# Patient Record
Sex: MALE | Race: ASIAN | NOT HISPANIC OR LATINO | Employment: FULL TIME | URBAN - METROPOLITAN AREA
[De-identification: names, ages, dates, MRNs, and addresses within clinical notes are randomized per-mention and may not be internally consistent; named-entity substitution may affect disease eponyms.]

---

## 2017-01-10 ENCOUNTER — HOSPITAL ENCOUNTER (EMERGENCY)
Facility: HOSPITAL | Age: 42
Discharge: HOME | End: 2017-01-11
Attending: EMERGENCY MEDICINE
Payer: MEDICAID

## 2017-01-10 DIAGNOSIS — N21.1 URETHRAL STONE: Primary | ICD-10-CM

## 2017-01-10 DIAGNOSIS — N36.8 URETHRAL MEATUS PAIN: ICD-10-CM

## 2017-01-10 DIAGNOSIS — N20.0 KIDNEY STONE: ICD-10-CM

## 2017-01-10 DIAGNOSIS — N48.89 PENIS PAIN: ICD-10-CM

## 2017-01-10 PROCEDURE — 51702 INSERT TEMP BLADDER CATH: CPT

## 2017-01-10 PROCEDURE — 25000003 PHARM REV CODE 250: Performed by: PHYSICIAN ASSISTANT

## 2017-01-10 PROCEDURE — 99284 EMERGENCY DEPT VISIT MOD MDM: CPT | Mod: 25

## 2017-01-10 PROCEDURE — 96365 THER/PROPH/DIAG IV INF INIT: CPT

## 2017-01-10 PROCEDURE — 96372 THER/PROPH/DIAG INJ SC/IM: CPT

## 2017-01-10 PROCEDURE — 96375 TX/PRO/DX INJ NEW DRUG ADDON: CPT

## 2017-01-10 PROCEDURE — 63600175 PHARM REV CODE 636 W HCPCS: Performed by: PHYSICIAN ASSISTANT

## 2017-01-10 RX ORDER — LIDOCAINE HYDROCHLORIDE 20 MG/ML
JELLY TOPICAL
Status: COMPLETED | OUTPATIENT
Start: 2017-01-10 | End: 2017-01-10

## 2017-01-10 RX ORDER — KETOROLAC TROMETHAMINE 30 MG/ML
30 INJECTION, SOLUTION INTRAMUSCULAR; INTRAVENOUS
Status: COMPLETED | OUTPATIENT
Start: 2017-01-10 | End: 2017-01-10

## 2017-01-10 RX ORDER — MORPHINE SULFATE 10 MG/ML
4 INJECTION INTRAMUSCULAR; INTRAVENOUS; SUBCUTANEOUS
Status: COMPLETED | OUTPATIENT
Start: 2017-01-10 | End: 2017-01-10

## 2017-01-10 RX ADMIN — MORPHINE SULFATE 4 MG: 10 INJECTION INTRAVENOUS at 10:01

## 2017-01-10 RX ADMIN — CEFTRIAXONE 1 G: 1 INJECTION, SOLUTION INTRAVENOUS at 11:01

## 2017-01-10 RX ADMIN — LIDOCAINE HYDROCHLORIDE 10 ML: 20 JELLY TOPICAL at 09:01

## 2017-01-10 RX ADMIN — KETOROLAC TROMETHAMINE 30 MG: 30 INJECTION, SOLUTION INTRAMUSCULAR at 10:01

## 2017-01-10 RX ADMIN — LIDOCAINE HYDROCHLORIDE: 20 JELLY TOPICAL at 11:01

## 2017-01-10 NOTE — ED AVS SNAPSHOT
[facility]  [place] [address]               Trace Vo   1/10/2017  9:26 PM   ED    Description:  Male : 1975   Department:  [facility]           Your Care was Coordinated By:     Provider Role From To    Celina Bojorquez MD Attending Provider 01/10/17 2135 --    Charlie Muro PA-C Physician Assistant 01/10/17 2135 --      Reason for Visit     Penis Pain           Diagnoses this Visit        Comments    Urethral stone    -  Primary     Kidney stone         Penis pain         Urethral meatus pain           ED Disposition     ED Disposition Condition Comment    Discharge             To Do List           Follow-up Information     Follow up with ROXIE Mckenzie MD. Schedule an appointment as soon as possible for a visit in 1 week.    Specialty:  Urology    Why:  for palomo catheter removal and further follow up    Contact information:    [address] [place] [address] [address]  [place] [address]  [phone]          Follow up with [facility].    Specialty:  Emergency Medicine    Why:  return if unable to tolerate pain, if unable to sleep, if unable to urinate.     Contact information:    [place] [address] [phone]       These Medications        Disp Refills Start End    ciprofloxacin HCl (CIPRO) 500 MG tablet 20 tablet 0 2017    Take 1 tablet (500 mg total) by mouth every 12 (twelve) hours. - Oral    Pharmacy: [facility] [address] Ph #: [phone]       tamsulosin (FLOMAX) 0.4 mg Cp24 30 capsule 2017    Take 1 capsule (0.4 mg total) by mouth once daily. - Oral    [address] [facility] [address] [facility] Ph #: [phone]       ibuprofen (ADVIL,MOTRIN) 600 MG tablet 30 tablet 0 2017     Take 1 tablet (600 mg total) by mouth every  6 (six) hours as needed for Pain. - Oral    Pharmacy: [id number] - [place]  [address] [place] [address] AT [facility] #: [phone]         [facility] On Call     [facility] On Call Nurse Care Line - 24/7 Assistance  Registered nurses in the [facility] provide clinical advisement, health education, appointment booking, and other advisory services.  Call for this free service at [phone].             Medications           Message regarding Medications     Verify the changes and/or additions to your medication regime listed below are the same as discussed with your clinician today.  If any of these changes or additions are incorrect, please notify your healthcare provider.        START taking these NEW medications        Refills    ciprofloxacin HCl (CIPRO) 500 MG tablet 0    Sig: Take 1 tablet (500 mg total) by mouth every 12 (twelve) hours.    Class: Print    Route: Oral    ibuprofen (ADVIL,MOTRIN) 600 MG tablet 0    Sig: Take 1 tablet (600 mg total) by mouth every 6 (six) hours as needed for Pain.    Class: Print    Route: Oral      These medications were administered today        Dose Freq    lidocaine HCl 2% urojet  ED 1 Time    Sig: by Mucous Membrane route ED 1 Time.    Class: Normal    Route: Mucous Membrane    Cosign for Ordering: Accepted by Celina Bojorquez MD on 1/11/2017 12:52 AM    ketorolac injection 30 mg 30 mg ED 1 Time    Sig: Inject 30 mg into the muscle ED 1 Time.    Class: Normal    Route: Intramuscular    Cosign for Ordering: Accepted by Celina Bojorquez MD on 1/11/2017 12:52 AM    morphine injection 4 mg 4 mg ED 1 Time    Sig: Inject 0.4 mLs (4 mg total) into the vein ED 1 Time.    Class: Normal    Route: Intravenous    Cosign for Ordering: Accepted by Celina Bojorquez MD on 1/11/2017 12:52 AM    lidocaine HCl 2% urojet  ED 1 Time    Sig: by Mucous Membrane route ED 1 Time.    Class: Normal    Route: Mucous Membrane    Cosign for Ordering: Accepted by  "Celina Bojorquez MD on 1/11/2017 12:52 AM    cefTRIAXone (ROCEPHIN) 1 g in dextrose 5 % 50 mL IVPB 1 g ED 1 Time    Sig: Inject 50 mLs (1 g total) into the vein ED 1 Time.    Class: Normal    Route: Intravenous    Cosign for Ordering: Accepted by Celina Bojorquez MD on 1/11/2017 12:52 AM    ciprofloxacin HCl tablet 500 mg 500 mg ED 1 Time    Sig: Take 1 tablet (500 mg total) by mouth ED 1 Time.    Class: Normal    Route: Oral      STOP taking these medications     oxycodone-acetaminophen (PERCOCET) 5-325 mg per tablet Take 1 tablet by mouth every 6 (six) hours as needed for Pain (Do not take additional Tylenol while taking this medication. This medication may cause drowsiness, do not drive or operate machinery with use).           Verify that the below list of medications is an accurate representation of the medications you are currently taking.  If none reported, the list may be blank. If incorrect, please contact your healthcare provider. Carry this list with you in case of emergency.           Current Medications     UNKNOWN TO PATIENT     ciprofloxacin HCl (CIPRO) 500 MG tablet Take 1 tablet (500 mg total) by mouth every 12 (twelve) hours.    ciprofloxacin HCl tablet 500 mg Take 1 tablet (500 mg total) by mouth ED 1 Time.    ibuprofen (ADVIL,MOTRIN) 600 MG tablet Take 1 tablet (600 mg total) by mouth every 6 (six) hours as needed for Pain.    tamsulosin (FLOMAX) 0.4 mg Cp24 Take 1 capsule (0.4 mg total) by mouth once daily.           Clinical Reference Information           Your Vitals Were     BP Pulse Temp Resp Height Weight    123/88 81 98.8 °F (37.1 °C) (Oral) 18 5' 5" (1.651 m) 61.2 kg (135 lb)    SpO2 BMI             97% 22.47 kg/m2         Allergies as of 1/11/2017     No Known Allergies      Immunizations Administered on Date of Encounter - 1/11/2017     None      ED Micro, Lab, POCT     Start Ordered       Status Ordering Provider    01/11/17 0056 01/11/17 0055  Urinalysis  STAT      Ordered     " 01/11/17 0056 01/11/17 0055  Urine culture **CANNOT BE ORDERED STAT**  Once      Ordered     01/11/17 0051 01/11/17 0050  Urinary Stone Analysis  Once      In process       ED Imaging Orders     None      Discharge References/Attachments     KIDNEY STONE W/ COLIC (Kazakh)    LEG BAG, DISCHARGE INSTRUCTIONS ([place]az[place])      [facility] Sign-Up     Activating your [facility] account is as easy as 1-2-3!     1) Visit [email or web address], select Sign Up Now, enter this activation code and your date of birth, then select Next.  Activation code not generated  Current Patient Portal Status: Account disabled      2) Create a username and password to use when you visit [facility] in the future and select a security question in case you lose your password and select Next.    3) Enter your e-mail address and click Sign Up!    Additional Information  If you have questions, please e-mail [email or web address] or call [phone] to talk to our [facility] staff. Remember, [facility] is NOT to be used for urgent needs. For medical emergencies, dial 911.         Smoking Cessation     If you would like to quit smoking:   You may be eligible for free services if you are a [place] resident and started smoking cigarettes before September 1, 1988.  Call the Smoking Cessation Trust (SCT) toll free at ([phone] 173-4304 or ([phone] 896-7535.   Call 7-341-QUIT-NOW if you do not meet the above criteria.             [facility] complies with applicable Federal civil rights laws and does not discriminate on the basis of race, color, national origin, age, disability, or sex.        Language Assistance Services     ATTENTION: Language assistance services are available, free of charge. Please call [phone].      ATENCIÓN: Si habla español, tiene a juarez disposición servicios gratuitos de asistencia lingüística. Llame al [phone].     CHÚ Ý: N?u b?n nói Ti?ng Vi?t, có các d?ch v? h? tr? ngôn ng? mi?n phí dành cho b?n. G?i s?  [phone].

## 2017-01-11 VITALS
RESPIRATION RATE: 18 BRPM | HEART RATE: 81 BPM | TEMPERATURE: 99 F | SYSTOLIC BLOOD PRESSURE: 123 MMHG | BODY MASS INDEX: 22.49 KG/M2 | OXYGEN SATURATION: 97 % | WEIGHT: 135 LBS | HEIGHT: 65 IN | DIASTOLIC BLOOD PRESSURE: 88 MMHG

## 2017-01-11 LAB
BACTERIA #/AREA URNS HPF: ABNORMAL /HPF
BILIRUB UR QL STRIP: NEGATIVE
CLARITY UR: CLEAR
COLOR UR: YELLOW
GLUCOSE UR QL STRIP: NEGATIVE
HGB UR QL STRIP: ABNORMAL
KETONES UR QL STRIP: NEGATIVE
LEUKOCYTE ESTERASE UR QL STRIP: NEGATIVE
MICROSCOPIC COMMENT: ABNORMAL
NITRITE UR QL STRIP: NEGATIVE
PH UR STRIP: 6 [PH] (ref 5–8)
PROT UR QL STRIP: NEGATIVE
RBC #/AREA URNS HPF: >100 /HPF (ref 0–4)
SP GR UR STRIP: 1.02 (ref 1–1.03)
URN SPEC COLLECT METH UR: ABNORMAL
UROBILINOGEN UR STRIP-ACNC: NEGATIVE EU/DL
WBC #/AREA URNS HPF: 2 /HPF (ref 0–5)

## 2017-01-11 PROCEDURE — 81000 URINALYSIS NONAUTO W/SCOPE: CPT

## 2017-01-11 PROCEDURE — 87086 URINE CULTURE/COLONY COUNT: CPT

## 2017-01-11 PROCEDURE — 51702 INSERT TEMP BLADDER CATH: CPT

## 2017-01-11 PROCEDURE — 82370 X-RAY ASSAY CALCULUS: CPT

## 2017-01-11 PROCEDURE — 25000003 PHARM REV CODE 250: Performed by: EMERGENCY MEDICINE

## 2017-01-11 RX ORDER — IBUPROFEN 600 MG/1
600 TABLET ORAL EVERY 6 HOURS PRN
Qty: 30 TABLET | Refills: 0 | Status: SHIPPED | OUTPATIENT
Start: 2017-01-11 | End: 2017-09-06

## 2017-01-11 RX ORDER — CIPROFLOXACIN 500 MG/1
500 TABLET ORAL
Status: COMPLETED | OUTPATIENT
Start: 2017-01-11 | End: 2017-01-11

## 2017-01-11 RX ORDER — CIPROFLOXACIN 500 MG/1
500 TABLET ORAL EVERY 12 HOURS
Qty: 20 TABLET | Refills: 0 | Status: SHIPPED | OUTPATIENT
Start: 2017-01-11 | End: 2017-01-21

## 2017-01-11 RX ORDER — TAMSULOSIN HYDROCHLORIDE 0.4 MG/1
0.4 CAPSULE ORAL DAILY
Qty: 30 CAPSULE | Refills: 1 | Status: SHIPPED | OUTPATIENT
Start: 2017-01-11 | End: 2017-09-06

## 2017-01-11 RX ADMIN — CIPROFLOXACIN HYDROCHLORIDE 500 MG: 500 TABLET, FILM COATED ORAL at 01:01

## 2017-01-11 NOTE — ED PROVIDER NOTES
Encounter Date: 1/10/2017       History     Chief Complaint   Patient presents with    Penis Pain     after passing a kidney stone     Review of patient's allergies indicates:  No Known Allergies  HPI Comments: 40yo m with pmh kidney stones, presents to ED with chief complaint kidney stone at tip of penis. Pt woke this morning and attempted to urinate, but was only able to empty a little out. He states for the past few months he has had issues emptying, that it takes a long time to empty fully. He states he felt pain at the tip of his penis, and on visualization could see a stone. He denies any abdominal pain. He denies flank pain. He denies recent illness/fever/chills. He denies any penile or testicular swelling. Denies hematuria.    The history is provided by the patient.     Past Medical History   Diagnosis Date    Kidney stones     Kidney stones      8 mos ago    Kidney stones     Unspecified disorder of kidney and ureter      No past medical history pertinent negatives.  Past Surgical History   Procedure Laterality Date    Kidney stone surgery       History reviewed. No pertinent family history.  Social History   Substance Use Topics    Smoking status: Current Every Day Smoker     Packs/day: 1.00     Types: Cigarettes    Smokeless tobacco: None    Alcohol use 0.5 oz/week     1 Standard drinks or equivalent per week     Review of Systems   Constitutional: Negative.  Negative for chills and fever.   HENT: Negative.    Eyes: Negative.    Respiratory: Negative.    Cardiovascular: Negative.    Gastrointestinal: Negative.  Negative for abdominal pain.   Endocrine: Negative.    Genitourinary: Positive for difficulty urinating, dysuria and penile pain. Negative for discharge, flank pain, genital sores, hematuria, penile swelling, scrotal swelling and testicular pain.   Musculoskeletal: Negative.  Negative for back pain.   Skin: Negative.    Allergic/Immunologic: Negative.    Neurological: Negative.     Hematological: Negative.    Psychiatric/Behavioral: Negative.    All other systems reviewed and are negative.      Physical Exam   Initial Vitals   BP Pulse Resp Temp SpO2   01/10/17 2117 01/10/17 2117 01/10/17 2117 01/10/17 2117 01/10/17 2117   138/81 97 18 97.9 °F (36.6 °C) 99 %     Physical Exam    Nursing note and vitals reviewed.  Constitutional: He appears well-developed and well-nourished. He is cooperative. He does not have a sickly appearance. He does not appear ill. No distress.   HENT:   Head: Normocephalic and atraumatic.   Nose: Nose normal.   Eyes: EOM are normal.   Neck: Normal range of motion. Neck supple.   Cardiovascular: Normal heart sounds.   Pulmonary/Chest: Breath sounds normal.   Abdominal: Soft. Normal appearance and bowel sounds are normal. There is no tenderness. There is no rebound, no CVA tenderness and negative Foley's sign.   No CVA tenderness, No suprapubic tenderness, No abdominal tenderness   Genitourinary: Testes normal. Right testis shows no mass and no tenderness. Left testis shows no mass and no tenderness. Circumcised. Penile tenderness present. No phimosis, paraphimosis or penile erythema. No discharge found.   Genitourinary Comments: Large stone at tip of urethral meatus. No signs of discharge or bleeding. Tip of penis is TTP. No signs of swelling, redness, or inflammation.    Musculoskeletal: Normal range of motion.   Neurological: He is alert and oriented to person, place, and time. He has normal strength.   Skin: Skin is warm and dry. No rash and no abscess noted. No erythema.   Psychiatric: He has a normal mood and affect. His behavior is normal. Judgment and thought content normal.         ED Course   Urinary Catheter  Date/Time: 1/11/2017 3:10 PM  Performed by: NICKY CONDE  Authorized by: NICKY CONDE   Comments: Urojet and IV morphine utilized to allow for manipulation of stone to distal ureter. Alligator forceps, pick-ups, clamps, hemostat, corneal kalpesh  drill utilized with eventual fragmentation and removal of stone. Bleeding from meatus subsequently but able to urinate 300 cc. RN placed palomo per  recommendation.        Labs Reviewed   URINALYSIS - Abnormal; Notable for the following:        Result Value    Occult Blood UA 3+ (*)     All other components within normal limits   URINALYSIS MICROSCOPIC - Abnormal; Notable for the following:     RBC, UA >100 (*)     All other components within normal limits   CULTURE, URINE   URINARY STONE ANALYSIS   TISSUE SPECIMEN TO PATHOLOGY - SURGERY             Medical Decision Making:   ED Management:  Well-appearing, in NAD. He is uncomfortable but not in any acute distress. Afebrile. He was here in November for urinary retention and was given Flomax, but he states he hasn't been taking any meds. He states this arose this morning, but with the size of the stone, I do suspect this may have been the culprit of the retention, and has migrated distally. Because of the extent of pain when urinating, removal attempted at bedside. Urojet lidocaine used, but was unable to pass lidocaine past the stone due to the extend of impaction. Multiple attempts made to get any instrument past the stone, without success. Spoke with Urology, who suggested further manipulation and attempts and gave advice on dealing with possible bleeding/issues after removal. My supervising physician, , was able to successfully remove stone from urethra. Urinalysis ordered. Specimen sent to pathology. Palomo catheter placed to be removed by urology. Pt to f/u with urology next week per Dr. Mckenzie.  Other:   I have discussed this case with another health care provider.              Attending Attestation:     Physician Attestation Statement for NP/PA:   I have conducted a face to face encounter with this patient in addition to the NP/PA, due to Medical Complexity    Other NP/PA Attestation Additions:    History of Present Illness: 41 y.o. Male with  acute onset pain to penis tonight.   Physical Exam: Awake/alert.  Palpable ~[id number] jagged stone at distal urethra approx 0.5cm from meatus. Patient is able to urinate around stone.    Medical Decision Making: Ddx includes obstruction, UTI, urethral stone, other. Exam c/w stone to urethra. Abx given empirically. Urojet and IV morphine utilized to allow for manipulation of stone to distal ureter. Alligator forceps, pick-ups, clamps, hemostat, corneal kalpesh drill utilized with eventual fragmentation and removal of stone. (Discussions by myself and PA with Dr. Mckenzie mid-procedure for advice.) Bleeding from meatus subsequently but able to urinate 300 cc. RN placed palomo per  recommendation. D/c'ed on flomax, cipro. Patient to f/u to  next week for palomo removal and reevaluation.                 ED Course     Clinical Impression:   The primary encounter diagnosis was Urethral stone. Diagnoses of Kidney stone and Penis pain were also pertinent to this visit.          Charlie Muro PA-C  01/11/17 143       Celina Bojorquez MD  01/11/17 6841

## 2017-01-11 NOTE — ED TRIAGE NOTES
"Pt with c/o penile pain that began this morning. Pt states, "I have a stone stuck in my penis."   "

## 2017-01-12 ENCOUNTER — HOSPITAL ENCOUNTER (EMERGENCY)
Facility: HOSPITAL | Age: 42
Discharge: HOME | End: 2017-01-12
Attending: EMERGENCY MEDICINE
Payer: MEDICAID

## 2017-01-12 VITALS
SYSTOLIC BLOOD PRESSURE: 114 MMHG | HEART RATE: 99 BPM | DIASTOLIC BLOOD PRESSURE: 70 MMHG | BODY MASS INDEX: 17.49 KG/M2 | WEIGHT: 105 LBS | OXYGEN SATURATION: 99 % | RESPIRATION RATE: 16 BRPM | TEMPERATURE: 98 F | HEIGHT: 65 IN

## 2017-01-12 DIAGNOSIS — T83.9XXA FOLEY CATHETER PROBLEM, INITIAL ENCOUNTER: Primary | ICD-10-CM

## 2017-01-12 PROCEDURE — 99283 EMERGENCY DEPT VISIT LOW MDM: CPT

## 2017-01-12 PROCEDURE — 51702 INSERT TEMP BLADDER CATH: CPT

## 2017-01-12 NOTE — ED NOTES
14f palomo catheter removed, 9mls of fluid pulled from balloon, pt tolerated well, physician notified

## 2017-01-12 NOTE — ED AVS SNAPSHOT
[facility]  [address] [place] [address]               Trace Vo   2017  8:41 AM   ED    Description:  Male : 1975   Department:  [facility]           Your Care was Coordinated By:     Provider Role From To    Arcenio Sanderson MD Attending Provider 17 0842 --      Reason for Visit     catheter malfunction           Diagnoses this Visit        Comments    Hayes catheter problem, initial encounter    -  Primary       ED Disposition     None           To Do List           Follow-up Information     Follow up with ROXIE Mckenzie MD. Schedule an appointment as soon as possible for a visit in 1 week.    Specialty:  Urology    Why:  As needed    Contact information:    [address] [facility]  [address]  [place] [address]  [phone]        [facility] On Call     [facility] Care [facility] -  Assistance  Registered nurses in the [facility] provide clinical advisement, health education, appointment booking, and other advisory services.  Call for this free service at [phone].             Medications           Message regarding Medications     Verify the changes and/or additions to your medication regime listed below are the same as discussed with your clinician today.  If any of these changes or additions are incorrect, please notify your healthcare provider.             Verify that the below list of medications is an accurate representation of the medications you are currently taking.  If none reported, the list may be blank. If incorrect, please contact your healthcare provider. Carry this list with you in case of emergency.           Current Medications     ciprofloxacin HCl (CIPRO) 500 MG tablet Take 1 tablet (500 mg total) by mouth every 12 (twelve) hours.    ibuprofen (ADVIL,MOTRIN) 600 MG tablet Take 1 tablet (600 mg total) by mouth every 6 (six) hours as needed for Pain.    tamsulosin (FLOMAX) 0.4 mg Cp24 Take 1  "capsule (0.4 mg total) by mouth once daily.    UNKNOWN TO PATIENT            Clinical Reference Information           Your Vitals Were     BP Pulse Temp Resp Height Weight    114/70 99 98.3 °F (36.8 °C) 16 5' 5" (1.651 m) 47.6 kg (105 lb)    SpO2 BMI             99% 17.47 kg/m2         Allergies as of 1/12/2017     No Known Allergies      Immunizations Administered on Date of Encounter - 1/12/2017     None      ED Micro, Lab, POCT     None      ED Imaging Orders     None      Discharge References/Attachments     VILLA CATHETER, CARE (Syriac)      [facility] Sign-Up     Activating your [facility] account is as easy as 1-2-3!     1) Visit [email or web address], select Sign Up Now, enter this activation code and your date of birth, then select Next.  Activation code not generated  Current Patient Portal Status: Account disabled      2) Create a username and password to use when you visit [facility] in the future and select a security question in case you lose your password and select Next.    3) Enter your e-mail address and click Sign Up!    Additional Information  If you have questions, please e-mail [email or web address] or call [phone] to talk to our [facility] staff. Remember, [facility] is NOT to be used for urgent needs. For medical emergencies, dial 911.         Smoking Cessation     If you would like to quit smoking:   You may be eligible for free services if you are a [place] resident and started smoking cigarettes before September 1, 1988.  Call the Smoking Cessation Trust (SCT) toll free at (779) 188-5991 or (327) 419-1081.   Call 5-636-QUIT-NOW if you do not meet the above criteria.             [facility] complies with applicable Federal civil rights laws and does not discriminate on the basis of race, color, national origin, age, disability, or sex.        Language Assistance Services     ATTENTION: Language assistance services are available, free of charge. Please call [phone]. "      ATENCIÓN: Si habla español, tiene a juarez disposición servicios gratuitos de asistencia lingüística. Llame al [phone].     CHÚ Ý: N?u b?n nói Ti?ng Vi?t, có các d?ch v? h? tr? ngôn ng? mi?n phí dành cho b?n. G?i s? [phone].

## 2017-01-12 NOTE — ED PROVIDER NOTES
"Encounter Date: 1/12/2017    SCRIBE #1 NOTE: I, Billie Weeks, am scribing for, and in the presence of,  Arcenio Sanderson MD. I have scribed the following portions of the note - Other sections scribed: HPI and ROS.       History     Chief Complaint   Patient presents with    catheter malfunction     pt states " I had a catheter put in yesterday and when I urinate, it leaks urine."     Review of patient's allergies indicates:  No Known Allergies  HPI Comments: CC: Palomo Catheter Malfunction    HPI: This 41 y.o. Male who has kidney stones presents to the ED for an evaluation due to having problems with his palomo catheter.  Pt reports he was recently evaluated in this ED on yesterday for difficulty urinating and reports a palomo catheter was placed during that time.  Pt reports he was sent home with the palomo in place and advised to follow up with urology.  Pt reports last night, the palomo draining properly into the bag; however, pt reports this am, he noticed that there was no urine draining to the bag.  Pt reports pushing and reports urine leaking around the catheter.  Pt denies fever, chills, nausea, emesis, diarrhea, abdominal pain, back pain, or any other associated symptoms.  No alleviating factors.    The history is provided by the patient. No  was used.     Past Medical History   Diagnosis Date    Kidney stones     Kidney stones      8 mos ago    Kidney stones     Unspecified disorder of kidney and ureter      Past Medical History Pertinent Negatives   Diagnosis Date Noted    Arthritis 1/12/2017    Asthma 1/12/2017    Cancer 1/12/2017    CHF (congestive heart failure) 1/12/2017    Depression 1/12/2017    Diabetes mellitus 1/12/2017    Encounter for blood transfusion 1/12/2017    GERD (gastroesophageal reflux disease) 1/12/2017    Hypertension 1/12/2017     Past Surgical History   Procedure Laterality Date    Kidney stone surgery       History reviewed. No pertinent family " history.  Social History   Substance Use Topics    Smoking status: Current Every Day Smoker     Packs/day: 1.00     Types: Cigarettes    Smokeless tobacco: None    Alcohol use 0.5 oz/week     1 Standard drinks or equivalent per week     Review of Systems   Constitutional: Negative for chills and fever.   HENT: Negative for ear pain and sore throat.    Respiratory: Negative for cough and shortness of breath.    Gastrointestinal: Negative for abdominal pain, diarrhea, nausea and vomiting.   Genitourinary: Negative for dysuria.        (+) palomo catheter malfunction   Musculoskeletal: Negative for back pain.   Skin: Negative for rash.   Neurological: Negative for headaches.       Physical Exam   Initial Vitals   BP Pulse Resp Temp SpO2   01/12/17 0837 01/12/17 0837 01/12/17 0837 01/12/17 0837 01/12/17 0837   114/70 99 16 98.3 °F (36.8 °C) 99 %     Physical Exam    Nursing note and vitals reviewed.  Constitutional: He appears well-developed and well-nourished.   HENT:   Head: Normocephalic and atraumatic.   Eyes: EOM are normal. Pupils are equal, round, and reactive to light.   Cardiovascular: Normal rate, regular rhythm, normal heart sounds and intact distal pulses.   Pulmonary/Chest: Breath sounds normal. No respiratory distress. He has no wheezes. He has no rhonchi. He has no rales. He exhibits no tenderness.   Abdominal: Soft. Bowel sounds are normal. He exhibits distension (Suprapubic). There is tenderness.   Musculoskeletal: Normal range of motion. He exhibits no edema.   Neurological: He is alert and oriented to person, place, and time.   Skin: Skin is warm and dry.         ED Course   Procedures  Labs Reviewed - No data to display         MEDICAL DECISION MAKING    This is an emergent evaluation of the patient's symptoms.  A decision was made to obtain the patient's old medical records.  If they were available, these records were reviewed.  Significant findings include recent ED evaluation with Palomo catheter  placement for urethral stones.  The patient states that his catheter is not draining and urine is draining around his catheter.  Catheter changed.  Successful drainage noted.  Discharge with urology follow-up and instructions to continue medications as prescribed.              Scribe Attestation:   Scribe #1: I performed the above scribed service and the documentation accurately describes the services I performed. I attest to the accuracy of the note.    Attending Attestation:           Physician Attestation for Scribe:  Physician Attestation Statement for Scribe #1: I, Arcenio Sanderson MD, reviewed documentation, as scribed by Billie Weeks in my presence, and it is both accurate and complete.                 ED Course     Clinical Impression:   The encounter diagnosis was Hayes catheter problem, initial encounter.          Arcenio Sanderson MD  01/12/17 09

## 2017-01-12 NOTE — ED TRIAGE NOTES
Pt reports he had a palomo cath with leg bag inserted yesterday - worked fine yesterday afternoon- overnight pt noted no urine in bag and urines leaks around catheter

## 2017-01-13 LAB — BACTERIA UR CULT: NO GROWTH

## 2017-01-25 LAB — URINARY STONE ANALYSIS: NORMAL

## 2017-09-06 ENCOUNTER — OFFICE VISIT (OUTPATIENT)
Dept: PRIMARY CARE CLINIC | Facility: CLINIC | Age: 42
End: 2017-09-06
Payer: MEDICAID

## 2017-09-06 VITALS
HEIGHT: 65 IN | DIASTOLIC BLOOD PRESSURE: 78 MMHG | BODY MASS INDEX: 23.04 KG/M2 | WEIGHT: 138.31 LBS | RESPIRATION RATE: 18 BRPM | OXYGEN SATURATION: 99 % | SYSTOLIC BLOOD PRESSURE: 122 MMHG | HEART RATE: 75 BPM | TEMPERATURE: 98 F

## 2017-09-06 DIAGNOSIS — N20.0 NEPHROLITHIASIS: Primary | ICD-10-CM

## 2017-09-06 DIAGNOSIS — Z00.00 WELLNESS EXAMINATION: ICD-10-CM

## 2017-09-06 DIAGNOSIS — Z72.0 TOBACCO USE: ICD-10-CM

## 2017-09-06 DIAGNOSIS — M54.9 BACK PAIN, UNSPECIFIED BACK LOCATION, UNSPECIFIED BACK PAIN LATERALITY, UNSPECIFIED CHRONICITY: ICD-10-CM

## 2017-09-06 PROCEDURE — 99213 OFFICE O/P EST LOW 20 MIN: CPT | Mod: S$GLB,,, | Performed by: INTERNAL MEDICINE

## 2017-09-06 PROCEDURE — 3008F BODY MASS INDEX DOCD: CPT | Mod: S$GLB,,, | Performed by: INTERNAL MEDICINE

## 2017-09-06 RX ORDER — VARENICLINE TARTRATE 0.5 (11)-1
KIT ORAL
Qty: 1 PACKAGE | Refills: 0 | Status: SHIPPED | OUTPATIENT
Start: 2017-09-06 | End: 2017-11-13 | Stop reason: SDUPTHER

## 2017-09-07 NOTE — PROGRESS NOTES
Subjective:       Patient ID: Trace Stoddard is a 41 y.o. male.    Chief Complaint: Annual Exam    HPI  Pt here for routine exam no symptoms no sob cp no SAXENA no PMH except kidney stone few mos ago locked in bladder urethra had to have cysto scope to remove         Also c/o intermittent back pain in mid back when move certain way and resolved no trauma no radiculopathy  Review of Systems   Constitutional: Negative for appetite change, chills and fever.   HENT: Negative for congestion, dental problem, hearing loss, nosebleeds and sore throat.    Eyes: Negative for discharge, redness and visual disturbance.   Respiratory: Negative for cough, chest tightness and shortness of breath.    Cardiovascular: Negative for chest pain, palpitations and leg swelling.   Gastrointestinal: Negative for abdominal pain, blood in stool, constipation and diarrhea.   Endocrine: Negative for cold intolerance, heat intolerance, polydipsia, polyphagia and polyuria.   Genitourinary: Negative for difficulty urinating, dysuria, frequency, hematuria and urgency.   Musculoskeletal: Negative for arthralgias, joint swelling and myalgias.   Skin: Negative for color change, rash and wound.   Allergic/Immunologic: Negative for environmental allergies and food allergies.   Neurological: Negative for dizziness, weakness, numbness and headaches.   Hematological: Negative for adenopathy. Does not bruise/bleed easily.   Psychiatric/Behavioral: Negative for agitation, behavioral problems, dysphoric mood, self-injury and sleep disturbance. The patient is not nervous/anxious.        Objective:      Physical Exam   Constitutional: He is oriented to person, place, and time. He appears well-developed and well-nourished. No distress.   HENT:   Head: Normocephalic and atraumatic.   Right Ear: External ear normal.   Left Ear: External ear normal.   Nose: Nose normal.   Mouth/Throat: Oropharynx is clear and moist. No oropharyngeal exudate.   Eyes: Conjunctivae and EOM are  normal. Pupils are equal, round, and reactive to light. Right eye exhibits no discharge. Left eye exhibits no discharge.   Neck: Normal range of motion. Neck supple. No thyromegaly present.   Cardiovascular: Normal rate, regular rhythm, normal heart sounds and intact distal pulses.  Exam reveals no gallop and no friction rub.    No murmur heard.  Pulmonary/Chest: Effort normal and breath sounds normal. No respiratory distress. He has no wheezes. He has no rales. He exhibits no tenderness.   Abdominal: Soft. Bowel sounds are normal. He exhibits no distension. There is no tenderness. There is no rebound and no guarding.   Musculoskeletal: Normal range of motion. He exhibits no edema, tenderness or deformity.   Lymphadenopathy:     He has no cervical adenopathy.   Neurological: He is alert and oriented to person, place, and time.   Skin: Skin is warm and dry. Capillary refill takes less than 2 seconds. No rash noted. No erythema.   Psychiatric: He has a normal mood and affect. Judgment and thought content normal.   Nursing note and vitals reviewed.      Assessment:       1. Nephrolithiasis    2. Wellness examination    3. Tobacco use    4. Back pain, unspecified back location, unspecified back pain laterality, unspecified chronicity        Plan:       Nephrolithiasis  -     POCT URINE DIPSTICK WITHOUT MICROSCOPE; Future; Expected date: 09/06/2017    Wellness examination  -     CBC auto differential; Future; Expected date: 09/06/2017  -     Comprehensive metabolic panel; Future; Expected date: 09/06/2017  -     Lipid panel; Future; Expected date: 09/06/2017    Tobacco use  -     X-Ray Chest PA And Lateral; Future; Expected date: 09/06/2017  -     varenicline (CHANTIX STARTING MONTH BOX) 0.5 mg (11)- 1 mg (42) tablet; Take one 0.5mg tab by mouth once daily X3 days,then increase to one 0.5mg tab twice daily X4 days,then increase to one 1mg tab twice daily  Dispense: 1 Package; Refill: 0    Back pain, unspecified back  location, unspecified back pain laterality, unspecified chronicity  -     X-Ray Thoracic Spine AP Lateral; Future; Expected date: 09/06/2017

## 2017-11-13 ENCOUNTER — OFFICE VISIT (OUTPATIENT)
Dept: PRIMARY CARE CLINIC | Facility: CLINIC | Age: 42
End: 2017-11-13
Payer: MEDICAID

## 2017-11-13 VITALS
HEART RATE: 94 BPM | DIASTOLIC BLOOD PRESSURE: 74 MMHG | HEIGHT: 65 IN | WEIGHT: 140.69 LBS | TEMPERATURE: 99 F | BODY MASS INDEX: 23.44 KG/M2 | RESPIRATION RATE: 18 BRPM | SYSTOLIC BLOOD PRESSURE: 118 MMHG

## 2017-11-13 DIAGNOSIS — Z72.0 TOBACCO USE: ICD-10-CM

## 2017-11-13 DIAGNOSIS — Z72.0 TOBACCO ABUSE DISORDER: Primary | ICD-10-CM

## 2017-11-13 DIAGNOSIS — B35.4 TINEA CORPORIS: ICD-10-CM

## 2017-11-13 PROCEDURE — 99213 OFFICE O/P EST LOW 20 MIN: CPT | Mod: PBBFAC,PN | Performed by: INTERNAL MEDICINE

## 2017-11-13 PROCEDURE — 99999 PR PBB SHADOW E&M-EST. PATIENT-LVL III: CPT | Mod: PBBFAC,,, | Performed by: INTERNAL MEDICINE

## 2017-11-13 PROCEDURE — 99213 OFFICE O/P EST LOW 20 MIN: CPT | Mod: S$PBB,,, | Performed by: INTERNAL MEDICINE

## 2017-11-13 RX ORDER — VARENICLINE TARTRATE 0.5 (11)-1
KIT ORAL
Qty: 1 PACKAGE | Refills: 0
Start: 2017-11-13

## 2017-11-13 RX ORDER — CLOTRIMAZOLE AND BETAMETHASONE DIPROPIONATE 10; .64 MG/G; MG/G
CREAM TOPICAL 2 TIMES DAILY
Qty: 30 G | Refills: 0
Start: 2017-11-13

## 2017-11-14 NOTE — PROGRESS NOTES
Subjective:       Patient ID: Trace Stoddard is a 42 y.o. male.    Chief Complaint: skin concerns    HPI  Pt c/o want quit smoke need help smoke 2 ppd request chantix no sob cp also has skin rash abdomen x 1 week getting bigger no other skin rash no sob cp  Review of Systems    Objective:      Physical Exam   Constitutional: He is oriented to person, place, and time. He appears well-developed and well-nourished. No distress.   HENT:   Head: Normocephalic and atraumatic.   Right Ear: External ear normal.   Left Ear: External ear normal.   Nose: Nose normal.   Mouth/Throat: Oropharynx is clear and moist. No oropharyngeal exudate.   Eyes: Conjunctivae and EOM are normal. Pupils are equal, round, and reactive to light. Right eye exhibits no discharge. Left eye exhibits no discharge.   Neck: Normal range of motion. Neck supple. No thyromegaly present.   Cardiovascular: Normal rate, regular rhythm, normal heart sounds and intact distal pulses.  Exam reveals no gallop and no friction rub.    No murmur heard.  Pulmonary/Chest: Effort normal and breath sounds normal. No respiratory distress. He has no wheezes. He has no rales. He exhibits no tenderness.   Abdominal: Soft. Bowel sounds are normal. He exhibits no distension. There is no tenderness. There is no rebound and no guarding.   Musculoskeletal: Normal range of motion. He exhibits no edema, tenderness or deformity.   Lymphadenopathy:     He has no cervical adenopathy.   Neurological: He is alert and oriented to person, place, and time.   Skin: Skin is warm and dry. Capillary refill takes less than 2 seconds. No rash noted. No erythema.   Circular eythematous rash epigastric area 2cm with clear center   Psychiatric: He has a normal mood and affect. Judgment and thought content normal.   Nursing note and vitals reviewed.      Assessment:       1. Tobacco abuse disorder    2. Tinea corporis    3. Tobacco use        Plan:       Tobacco abuse disorder    Tinea corporis    Tobacco  use  -     varenicline (CHANTIX STARTING MONTH BOX) 0.5 mg (11)- 1 mg (42) tablet; Take one 0.5mg tab by mouth once daily X3 days,then increase to one 0.5mg tab twice daily X4 days,then increase to one 1mg tab twice daily  Dispense: 1 Package; Refill: 0    Other orders  -     clotrimazole-betamethasone 1-0.05% (LOTRISONE) cream; Apply topically 2 (two) times daily.  Dispense: 30 g; Refill: 0

## 2020-10-05 ENCOUNTER — PATIENT MESSAGE (OUTPATIENT)
Dept: ADMINISTRATIVE | Facility: HOSPITAL | Age: 45
End: 2020-10-05